# Patient Record
Sex: FEMALE | Race: WHITE | HISPANIC OR LATINO | Employment: FULL TIME | URBAN - METROPOLITAN AREA
[De-identification: names, ages, dates, MRNs, and addresses within clinical notes are randomized per-mention and may not be internally consistent; named-entity substitution may affect disease eponyms.]

---

## 2019-04-25 ENCOUNTER — HOSPITAL ENCOUNTER (EMERGENCY)
Facility: MEDICAL CENTER | Age: 19
End: 2019-04-25
Attending: EMERGENCY MEDICINE
Payer: COMMERCIAL

## 2019-04-25 VITALS
DIASTOLIC BLOOD PRESSURE: 93 MMHG | SYSTOLIC BLOOD PRESSURE: 142 MMHG | RESPIRATION RATE: 16 BRPM | BODY MASS INDEX: 20.96 KG/M2 | WEIGHT: 122.8 LBS | OXYGEN SATURATION: 98 % | HEIGHT: 64 IN | HEART RATE: 101 BPM | TEMPERATURE: 98.3 F

## 2019-04-25 DIAGNOSIS — S51.812A LACERATION OF LEFT FOREARM, INITIAL ENCOUNTER: ICD-10-CM

## 2019-04-25 PROCEDURE — 303747 HCHG EXTRA SUTURE

## 2019-04-25 PROCEDURE — 99283 EMERGENCY DEPT VISIT LOW MDM: CPT

## 2019-04-25 PROCEDURE — 304999 HCHG REPAIR-SIMPLE/INTERMED LEVEL 1

## 2019-04-25 PROCEDURE — 304217 HCHG IRRIGATION SYSTEM

## 2019-04-25 ASSESSMENT — ENCOUNTER SYMPTOMS
BRUISES/BLEEDS EASILY: 0
FOCAL WEAKNESS: 0
TINGLING: 0

## 2019-04-26 NOTE — ED PROVIDER NOTES
"ED Provider Note    Scribed for ANTHONY Ferreira II* by Louie Landry. 4/25/2019  6:30 PM    Means of Arrival: Walk In  History obtained by: Patient  Limitations: None    CHIEF COMPLAINT  Chief Complaint   Patient presents with   • Laceration     Pt cut her L inner arm near the elbow on a piece of glass while taking out the trash. Bleeding controlled.        HPI  Mari Taveras is a 19 y.o. female who presents to the Emergency Department for evaluation of a laceration which was sustained approximately 1.5 hours. She states that she was going to take out the trash and while doing so cut her inner left arm on a shard of glass. There is no active bleeding at this time, and she denies any weakness or tingling to the laceration    REVIEW OF SYSTEMS  Review of Systems   Musculoskeletal: Negative for joint pain.   Skin:        Positive: Laceration   Neurological: Negative for tingling and focal weakness.   Endo/Heme/Allergies: Does not bruise/bleed easily.     See HPI for further details.    PAST MEDICAL HISTORY  Patient denies any past medical problems or history.    SOCIAL HISTORY  Social History     Social History Main Topics   • Smoking status: Never Smoker   • Smokeless tobacco: Never Used   • Alcohol use No   • Drug use: No   • Sexual activity: None noted       SURGICAL HISTORY  patient denies any surgical history    CURRENT MEDICATIONS  Home Medications     Reviewed by Brandy Mcwilliams R.N. (Registered Nurse) on 04/25/19 at 1824  Med List Status: Complete   Medication Last Dose Status        Patient Casey Taking any Medications                       ALLERGIES  Allergies   Allergen Reactions   • Pcn [Penicillins] Hives       PHYSICAL EXAM  VITAL SIGNS: /93   Pulse (!) 101   Temp 36.8 °C (98.3 °F) (Temporal)   Resp 16   Ht 1.626 m (5' 4\")   Wt 55.7 kg (122 lb 12.7 oz)   SpO2 98%   Breastfeeding? No   BMI 21.08 kg/m²     Heart: Regular rate and rythm, no murmurs.    Lungs: No respiratory " distress, regular respirations. Clear to auscultation bilaterally.  Skin: Warm, Dry, No erythema, No rash. 3 cm partial thickness laceration to the medial aspect of the upper left extremity    DIAGNOSTIC STUDIES / PROCEDURES    Laceration Repair Procedure Note    Indication: Laceration    Procedure: The patient was placed in the appropriate position and no anesthesia used. The area was then irrigated with normal saline. The laceration was closed with Dermabond. There were no additional lacerations requiring repair. The wound area was then dressed with abx ointment and dressing.      Total repaired wound length: 3 cm.     Other Items: None    The patient tolerated the procedure well.    Complications: None    COURSE & MEDICAL DECISION MAKING  Pertinent Labs & Imaging studies reviewed. (See chart for details)    6:43 PM This is a 19 y.o. female who presents with a laceration and the differential diagnosis includes but is not limited to laceration. Informed her that I will close the laceration, and discussed the option of using sutures or dermabond depending on whether she was concerned with having a more pronounced scar or not. Patient requested use of dermabond.    6:56 PM - I performed a laceration repair procedure at this time as outlined above. She was given wound care instructions and is instructed to return here in 10 days to have her sutures removed. She understands and the recommended plan of follow up care and return, and agrees to discharge.    The patient is referred to a primary physician for blood pressure management, diabetic screening, and for all other preventative health concerns.    DISPOSITION:  Patient will be discharged home in stable condition.    FOLLOW UP:  Follow up with your primary provider as needed. If any concerns of infection you can come back to the ER to be re-checked          FINAL IMPRESSION  1. Laceration of left forearm, initial encounter          Louie MUSTAFAScribe), am  scribing for, and in the presence of, MILVIA Ferreira II.    Electronically signed by: Louie Landry (Scribe), 4/25/2019    I, MILVIA Ferreira II personally performed the services described in this documentation, as scribed by Louie Landry in my presence, and it is both accurate and complete. E.    The note accurately reflects work and decisions made by me.  Angelo Puentes II  4/25/2019  8:37 PM

## 2019-04-26 NOTE — ED NOTES
Discharge instructions given to pt including follow up with pcp or returning if no improvement of symptoms or to return if worse. Questions answered by RN. Denies any new complaints. Discharged w/stable vitals and able to ambulate w/steady gait. Instructed not to keep it wet x 24 hrs and to watch signs and symptoms of infection.

## 2019-04-26 NOTE — ED TRIAGE NOTES
"Chief Complaint   Patient presents with   • Laceration     Pt cut her L inner arm near the elbow on a piece of glass while taking out the trash. Bleeding controlled.      /93   Pulse (!) 101   Temp 36.8 °C (98.3 °F) (Temporal)   Resp 16   Ht 1.626 m (5' 4\")   Wt 55.7 kg (122 lb 12.7 oz)   SpO2 98%   Breastfeeding? No   BMI 21.08 kg/m²     Pt ambulated into triage with family, complaints as above. VS as above, NAD, encouraged to return to the triage nurse or tech with any new complaints or symptoms.  "

## 2022-02-07 LAB
HBV SURFACE AG SERPL QL IA: NORMAL
HIV 1+2 AB+HIV1 P24 AG SERPL QL IA: NORMAL
RUBV IGG SERPL IA-ACNC: NORMAL
TREPONEMA PALLIDUM IGG+IGM AB [PRESENCE] IN SERUM OR PLASMA BY IMMUNOASSAY: NORMAL

## 2022-06-09 LAB
ABO GROUP BLD: NORMAL
RH BLD: NORMAL

## 2022-06-17 ENCOUNTER — NON-PROVIDER VISIT (OUTPATIENT)
Dept: URGENT CARE | Facility: CLINIC | Age: 22
End: 2022-06-17

## 2022-06-17 DIAGNOSIS — Z02.1 PRE-EMPLOYMENT DRUG SCREENING: ICD-10-CM

## 2022-06-17 LAB
AMP AMPHETAMINE: NORMAL
BAR BARBITURATES: NORMAL
BZO BENZODIAZEPINES: NORMAL
COC COCAINE: NORMAL
INT CON NEG: NORMAL
INT CON POS: NORMAL
MDMA ECSTASY: NORMAL
MET METHAMPHETAMINES: NORMAL
MTD METHADONE: NORMAL
OPI OPIATES: NORMAL
OXY OXYCODONE: NORMAL
PCP PHENCYCLIDINE: NORMAL
POC URINE DRUG SCREEN OCDRS: NEGATIVE
THC: NORMAL

## 2022-06-17 PROCEDURE — 80305 DRUG TEST PRSMV DIR OPT OBS: CPT | Performed by: STUDENT IN AN ORGANIZED HEALTH CARE EDUCATION/TRAINING PROGRAM

## 2022-08-07 ENCOUNTER — ANESTHESIA (OUTPATIENT)
Dept: OBGYN | Facility: MEDICAL CENTER | Age: 22
End: 2022-08-07
Payer: COMMERCIAL

## 2022-08-07 ENCOUNTER — HOSPITAL ENCOUNTER (INPATIENT)
Facility: MEDICAL CENTER | Age: 22
LOS: 3 days | End: 2022-08-10
Attending: SPECIALIST | Admitting: SPECIALIST
Payer: COMMERCIAL

## 2022-08-07 ENCOUNTER — ANESTHESIA EVENT (OUTPATIENT)
Dept: OBGYN | Facility: MEDICAL CENTER | Age: 22
End: 2022-08-07
Payer: COMMERCIAL

## 2022-08-07 LAB
A1 MICROGLOB PLACENTAL VAG QL: POSITIVE
ALBUMIN SERPL BCP-MCNC: 3.8 G/DL (ref 3.2–4.9)
ALBUMIN/GLOB SERPL: 1.3 G/DL
ALP SERPL-CCNC: 506 U/L (ref 30–99)
ALT SERPL-CCNC: 11 U/L (ref 2–50)
ANION GAP SERPL CALC-SCNC: 15 MMOL/L (ref 7–16)
AST SERPL-CCNC: 11 U/L (ref 12–45)
BASOPHILS # BLD AUTO: 0.2 % (ref 0–1.8)
BASOPHILS # BLD: 0.02 K/UL (ref 0–0.12)
BILIRUB SERPL-MCNC: 0.3 MG/DL (ref 0.1–1.5)
BUN SERPL-MCNC: 8 MG/DL (ref 8–22)
CALCIUM SERPL-MCNC: 8.9 MG/DL (ref 8.5–10.5)
CHLORIDE SERPL-SCNC: 106 MMOL/L (ref 96–112)
CO2 SERPL-SCNC: 17 MMOL/L (ref 20–33)
CREAT SERPL-MCNC: 0.41 MG/DL (ref 0.5–1.4)
CREAT UR-MCNC: 79.61 MG/DL
EOSINOPHIL # BLD AUTO: 0.04 K/UL (ref 0–0.51)
EOSINOPHIL NFR BLD: 0.4 % (ref 0–6.9)
ERYTHROCYTE [DISTWIDTH] IN BLOOD BY AUTOMATED COUNT: 45.4 FL (ref 35.9–50)
GFR SERPLBLD CREATININE-BSD FMLA CKD-EPI: 141 ML/MIN/1.73 M 2
GLOBULIN SER CALC-MCNC: 3 G/DL (ref 1.9–3.5)
GLUCOSE SERPL-MCNC: 102 MG/DL (ref 65–99)
GP B STREP DNA SPEC QL NAA+PROBE: NEGATIVE
HCT VFR BLD AUTO: 41.5 % (ref 37–47)
HGB BLD-MCNC: 14.4 G/DL (ref 12–16)
HOLDING TUBE BB 8507: NORMAL
IMM GRANULOCYTES # BLD AUTO: 0.06 K/UL (ref 0–0.11)
IMM GRANULOCYTES NFR BLD AUTO: 0.6 % (ref 0–0.9)
LYMPHOCYTES # BLD AUTO: 1.49 K/UL (ref 1–4.8)
LYMPHOCYTES NFR BLD: 15 % (ref 22–41)
MCH RBC QN AUTO: 32.1 PG (ref 27–33)
MCHC RBC AUTO-ENTMCNC: 34.7 G/DL (ref 33.6–35)
MCV RBC AUTO: 92.6 FL (ref 81.4–97.8)
MONOCYTES # BLD AUTO: 0.78 K/UL (ref 0–0.85)
MONOCYTES NFR BLD AUTO: 7.9 % (ref 0–13.4)
NEUTROPHILS # BLD AUTO: 7.53 K/UL (ref 2–7.15)
NEUTROPHILS NFR BLD: 75.9 % (ref 44–72)
NRBC # BLD AUTO: 0 K/UL
NRBC BLD-RTO: 0 /100 WBC
PLATELET # BLD AUTO: 271 K/UL (ref 164–446)
PMV BLD AUTO: 10.3 FL (ref 9–12.9)
POTASSIUM SERPL-SCNC: 3.7 MMOL/L (ref 3.6–5.5)
PROT SERPL-MCNC: 6.8 G/DL (ref 6–8.2)
PROT UR-MCNC: 15 MG/DL (ref 0–15)
PROT/CREAT UR: 188 MG/G (ref 10–107)
RBC # BLD AUTO: 4.48 M/UL (ref 4.2–5.4)
SODIUM SERPL-SCNC: 138 MMOL/L (ref 135–145)
URATE SERPL-MCNC: 3.4 MG/DL (ref 1.9–8.2)
WBC # BLD AUTO: 9.9 K/UL (ref 4.8–10.8)

## 2022-08-07 PROCEDURE — 84550 ASSAY OF BLOOD/URIC ACID: CPT

## 2022-08-07 PROCEDURE — 87150 DNA/RNA AMPLIFIED PROBE: CPT

## 2022-08-07 PROCEDURE — 700111 HCHG RX REV CODE 636 W/ 250 OVERRIDE (IP): Performed by: SPECIALIST

## 2022-08-07 PROCEDURE — 700102 HCHG RX REV CODE 250 W/ 637 OVERRIDE(OP): Performed by: SPECIALIST

## 2022-08-07 PROCEDURE — 700101 HCHG RX REV CODE 250: Performed by: SPECIALIST

## 2022-08-07 PROCEDURE — 3E0P7VZ INTRODUCTION OF HORMONE INTO FEMALE REPRODUCTIVE, VIA NATURAL OR ARTIFICIAL OPENING: ICD-10-PCS | Performed by: SPECIALIST

## 2022-08-07 PROCEDURE — 85025 COMPLETE CBC W/AUTO DIFF WBC: CPT

## 2022-08-07 PROCEDURE — 87081 CULTURE SCREEN ONLY: CPT

## 2022-08-07 PROCEDURE — 84112 EVAL AMNIOTIC FLUID PROTEIN: CPT

## 2022-08-07 PROCEDURE — 80053 COMPREHEN METABOLIC PANEL: CPT

## 2022-08-07 PROCEDURE — 302449 STATCHG TRIAGE ONLY (STATISTIC)

## 2022-08-07 PROCEDURE — 36415 COLL VENOUS BLD VENIPUNCTURE: CPT

## 2022-08-07 PROCEDURE — 3E033VJ INTRODUCTION OF OTHER HORMONE INTO PERIPHERAL VEIN, PERCUTANEOUS APPROACH: ICD-10-PCS | Performed by: SPECIALIST

## 2022-08-07 PROCEDURE — 700105 HCHG RX REV CODE 258: Performed by: SPECIALIST

## 2022-08-07 PROCEDURE — 770002 HCHG ROOM/CARE - OB PRIVATE (112)

## 2022-08-07 PROCEDURE — 87653 STREP B DNA AMP PROBE: CPT

## 2022-08-07 PROCEDURE — 82570 ASSAY OF URINE CREATININE: CPT

## 2022-08-07 PROCEDURE — A9270 NON-COVERED ITEM OR SERVICE: HCPCS | Performed by: SPECIALIST

## 2022-08-07 PROCEDURE — 84156 ASSAY OF PROTEIN URINE: CPT

## 2022-08-07 RX ORDER — TERBUTALINE SULFATE 1 MG/ML
0.25 INJECTION, SOLUTION SUBCUTANEOUS
Status: DISCONTINUED | OUTPATIENT
Start: 2022-08-07 | End: 2022-08-08

## 2022-08-07 RX ORDER — ONDANSETRON 4 MG/1
4 TABLET, ORALLY DISINTEGRATING ORAL EVERY 6 HOURS PRN
Status: DISCONTINUED | OUTPATIENT
Start: 2022-08-07 | End: 2022-08-08

## 2022-08-07 RX ORDER — OXYTOCIN 10 [USP'U]/ML
10 INJECTION, SOLUTION INTRAMUSCULAR; INTRAVENOUS
Status: DISCONTINUED | OUTPATIENT
Start: 2022-08-07 | End: 2022-08-08 | Stop reason: HOSPADM

## 2022-08-07 RX ORDER — BETAMETHASONE SODIUM PHOSPHATE AND BETAMETHASONE ACETATE 3; 3 MG/ML; MG/ML
12 INJECTION, SUSPENSION INTRA-ARTICULAR; INTRALESIONAL; INTRAMUSCULAR; SOFT TISSUE EVERY 24 HOURS
Status: DISCONTINUED | OUTPATIENT
Start: 2022-08-07 | End: 2022-08-08

## 2022-08-07 RX ORDER — SODIUM CHLORIDE, SODIUM LACTATE, POTASSIUM CHLORIDE, CALCIUM CHLORIDE 600; 310; 30; 20 MG/100ML; MG/100ML; MG/100ML; MG/100ML
INJECTION, SOLUTION INTRAVENOUS CONTINUOUS
Status: DISCONTINUED | OUTPATIENT
Start: 2022-08-07 | End: 2022-08-08

## 2022-08-07 RX ORDER — IBUPROFEN 800 MG/1
800 TABLET ORAL
Status: COMPLETED | OUTPATIENT
Start: 2022-08-07 | End: 2022-08-08

## 2022-08-07 RX ORDER — ACETAMINOPHEN 500 MG
1000 TABLET ORAL
Status: DISCONTINUED | OUTPATIENT
Start: 2022-08-07 | End: 2022-08-08 | Stop reason: HOSPADM

## 2022-08-07 RX ORDER — CLINDAMYCIN PHOSPHATE 600 MG/50ML
600 INJECTION, SOLUTION INTRAVENOUS EVERY 8 HOURS
Status: DISCONTINUED | OUTPATIENT
Start: 2022-08-07 | End: 2022-08-07

## 2022-08-07 RX ORDER — ONDANSETRON 2 MG/ML
4 INJECTION INTRAMUSCULAR; INTRAVENOUS EVERY 6 HOURS PRN
Status: DISCONTINUED | OUTPATIENT
Start: 2022-08-07 | End: 2022-08-08

## 2022-08-07 RX ADMIN — SODIUM CHLORIDE, POTASSIUM CHLORIDE, SODIUM LACTATE AND CALCIUM CHLORIDE: 600; 310; 30; 20 INJECTION, SOLUTION INTRAVENOUS at 22:15

## 2022-08-07 RX ADMIN — SODIUM CHLORIDE, POTASSIUM CHLORIDE, SODIUM LACTATE AND CALCIUM CHLORIDE: 600; 310; 30; 20 INJECTION, SOLUTION INTRAVENOUS at 11:02

## 2022-08-07 RX ADMIN — OXYTOCIN 1 MILLI-UNITS/MIN: 10 INJECTION, SOLUTION INTRAMUSCULAR; INTRAVENOUS at 22:15

## 2022-08-07 RX ADMIN — CLINDAMYCIN IN 5 PERCENT DEXTROSE 600 MG: 12 INJECTION, SOLUTION INTRAVENOUS at 12:44

## 2022-08-07 RX ADMIN — MISOPROSTOL 25 MCG: 100 TABLET ORAL at 11:43

## 2022-08-07 RX ADMIN — BETAMETHASONE SODIUM PHOSPHATE AND BETAMETHASONE ACETATE 12 MG: 3; 3 INJECTION, SUSPENSION INTRA-ARTICULAR; INTRALESIONAL; INTRAMUSCULAR; SOFT TISSUE at 11:43

## 2022-08-07 RX ADMIN — MISOPROSTOL 25 MCG: 100 TABLET ORAL at 16:29

## 2022-08-07 ASSESSMENT — LIFESTYLE VARIABLES
AVERAGE NUMBER OF DAYS PER WEEK YOU HAVE A DRINK CONTAINING ALCOHOL: 0
CONSUMPTION TOTAL: NEGATIVE
ON A TYPICAL DAY WHEN YOU DRINK ALCOHOL HOW MANY DRINKS DO YOU HAVE: 0
EVER HAD A DRINK FIRST THING IN THE MORNING TO STEADY YOUR NERVES TO GET RID OF A HANGOVER: NO
TOTAL SCORE: 0
TOTAL SCORE: 0
EVER_SMOKED: NEVER
HAVE PEOPLE ANNOYED YOU BY CRITICIZING YOUR DRINKING: NO
HOW MANY TIMES IN THE PAST YEAR HAVE YOU HAD 5 OR MORE DRINKS IN A DAY: 0
ALCOHOL_USE: NO
EVER FELT BAD OR GUILTY ABOUT YOUR DRINKING: NO
TOTAL SCORE: 0
HAVE YOU EVER FELT YOU SHOULD CUT DOWN ON YOUR DRINKING: NO

## 2022-08-07 ASSESSMENT — COPD QUESTIONNAIRES
DO YOU EVER COUGH UP ANY MUCUS OR PHLEGM?: NO/ONLY WITH OCCASIONAL COLDS OR INFECTIONS
IN THE PAST 12 MONTHS DO YOU DO LESS THAN YOU USED TO BECAUSE OF YOUR BREATHING PROBLEMS: DISAGREE/UNSURE
DURING THE PAST 4 WEEKS HOW MUCH DID YOU FEEL SHORT OF BREATH: NONE/LITTLE OF THE TIME
COPD SCREENING SCORE: 0
HAVE YOU SMOKED AT LEAST 100 CIGARETTES IN YOUR ENTIRE LIFE: NO/DON'T KNOW

## 2022-08-07 ASSESSMENT — PATIENT HEALTH QUESTIONNAIRE - PHQ9
1. LITTLE INTEREST OR PLEASURE IN DOING THINGS: NOT AT ALL
SUM OF ALL RESPONSES TO PHQ9 QUESTIONS 1 AND 2: 0
2. FEELING DOWN, DEPRESSED, IRRITABLE, OR HOPELESS: NOT AT ALL

## 2022-08-07 ASSESSMENT — PAIN DESCRIPTION - PAIN TYPE: TYPE: ACUTE PAIN

## 2022-08-07 NOTE — PROGRESS NOTES
0950- pt to triage bed 5 c/o lof since 0700. Pt states gush of clear fluid. Pt denies uc or bleeding and states +fm. Pt placed on efm/toco with audible fht 135. Will do sve and fern if necessary. 1st bp elevated so cuff switched arm and retaken. Pt denies ha,blurred vision or epigastric pain and no clonus noted    1020-called a second nurse to come do sve unable to reach cervix. Will both nurses exam clear fluid noted. Spoke to dr mercado and orders rec'd for iol admission with [facility] labs and cytotec. Pt aware of poc and charge rn aware    1100- report given to lillian lloyd

## 2022-08-07 NOTE — CARE PLAN
The patient is Stable - Low risk of patient condition declining or worsening    Shift Goals  Clinical Goals: healthy baby, healthy mom  Patient Goals: healthy baby, healthy mom  Family Goals: stable    Progress made toward(s) clinical / shift goals:  monitoring baby and maternal vitals     Patient is not progressing towards the following goals:

## 2022-08-07 NOTE — PROGRESS NOTES
1100-  Report received from Zakiya ALCALA. Plan of care discussed. Patient moved to labor room.     1145- patient given first dose of betamethasone.     1200- Dr Andersen updated on patient status. Order received for antibiotics. GBS collected and sent to lab.     1545- Dr Andersen updated on patient status. Okay for patient to eat. Order received for oral cytotec.     1900- report given to Hermelinda ALCALA

## 2022-08-07 NOTE — H&P
Nitin Mittal Mari          YOB: 2000  Date of today's admission: 2022  Facility: [facility] Labor & Delivery    ID: The patient is a very pleasant 22-year old nullipara who is today 35 weeks and 0 days gestation.    Chief Complaint: The patient presented to Labor & Delivery this morning with complaints of leakage of fluid per vagina starting this morning.    History of Present Illness: The patient has had prenatal care with Dr. Blackburn.  Her pregnancy has been uncomplicated until today when she presented to Labor & Delivery with complaints of leakage of fluid per vagina starting earlier this morning.  She presented to labor and delivery at about 10:00 this morning with complaints of leakage of fluid per vagina starting at about 7:00 this morning.  She stated that she had noticed a gush of clear fluid per vagina.  She denied any uterine contractions and she denied any vaginal bleeding and stated that she was continuing to feel fetal movement.  Speculum exam revealed obvious evidence of spontaneous rupture of membranes.  An AmniSure test was performed and was positive.  She was diagnosed as having  premature rupture of membranes (PPROM).  She was admitted.  Her cervix was found to be unfavorable and so she was given a dose of Cytotec later in the morning.  Because of unknown group B strep status she was started on antibiotics.  Because she states she is allergic to penicillin she was started on clindamycin intravenously.  The fetal heart tracing is category 1.  She says that she feels fine and has no problems or complaints and her vital signs are stable and she is afebrile and she appears well-developed and well-nourished and relaxed and alert and comfortable and in no apparent distress.  We will recheck her cervix and give another dose of Cytotec if her cervix is still unchanged.    Past Medical History: The patient says that she has no known  drug allergies.    Past Surgical History: The patient says that she has had no previous surgeries.    Medications: The patient does not normally take any medications other than for prenatal vitamins.    Allergies: The patient says that she is allergic to penicillins.    Social History: The patient denies smoking.  She denies consuming alcoholic beverages.  She denies the use of recreational drugs.    Review of Systems  General: The patient denies any fevers, chills, sweats.  Pulmonary: The patient denies any coughing, wheezing, chest pain, shortness of breath.  Cardiovascular: The patient denies any palpitations, dyspnea, chest pain.  Gastrointestinal: The patient denies any nausea, vomiting, diarrhea, constipation, hematochezia, melena, history of hepatitis, history of jaundice.  Genitourinary: The patient complains of leakage of fluid per vagina starting at 7:00 this morning.  Musculoskeletal: The patient denies any arthralgias or myalgias other than for hip pain and low back pain.   Neurological: No headaches or syncope or seizures.     Physical Exam:   Vital Signs: The patient's vital signs are stable and she is afebrile.  General: The patient appears well developed and well nourished and relaxed and alert and comfortable and in no apparent distress.    HEENT :  Normo-cephalic, atraumatic, pupils equal, round, reactive to light and accommodation, extra ocular motions intact, pharynx clear; there is no thyromegaly. There is no cervical lymphadenopathy.  Chest: Heart regular rate and rhythm, with no murmurs or rubs or gallops; the lungs are clear to auscultation bilaterally.  Abdomen: The abdomen is gravid and is about 8 months size.   Pelvic: The cervix is fingertip and 50% effaced and -1 station.  Extremities: No clubbing or cyanosis or edema.   Neurological: non-focal.     Assessment:   1.)  Intrauterine pregnancy at 35 weeks and 0 days gestation.  2.)   premature rupture of membranes (PPROM).    Plan:    We we will continue electronic fetal monitoring and recheck her cervix and likely give another dose of Cytotec 25 mcg this time perhaps orally.  I discussed with the patient today the subject of  premature rupture of membranes and explained to her that because she is now 35 weeks gestation that induction of labor is indicated.  I discussed with her the subject of induction of labor.  She acknowledged the things that we discussed.  We will give analgesia as needed and continue clindamycin intravenously because of unknown group B strep and we will anticipate obstetrical delivery.            ________________________  Walter Andersen M.D.

## 2022-08-08 LAB — GP B STREP DNA SPEC QL NAA+PROBE: NEGATIVE

## 2022-08-08 PROCEDURE — 700111 HCHG RX REV CODE 636 W/ 250 OVERRIDE (IP): Performed by: ANESTHESIOLOGY

## 2022-08-08 PROCEDURE — 700102 HCHG RX REV CODE 250 W/ 637 OVERRIDE(OP): Performed by: ANESTHESIOLOGY

## 2022-08-08 PROCEDURE — 770002 HCHG ROOM/CARE - OB PRIVATE (112)

## 2022-08-08 PROCEDURE — 700105 HCHG RX REV CODE 258: Performed by: ANESTHESIOLOGY

## 2022-08-08 PROCEDURE — 160048 HCHG OR STATISTICAL LEVEL 1-5: Performed by: SPECIALIST

## 2022-08-08 PROCEDURE — C1755 CATHETER, INTRASPINAL: HCPCS | Performed by: SPECIALIST

## 2022-08-08 PROCEDURE — 700102 HCHG RX REV CODE 250 W/ 637 OVERRIDE(OP): Performed by: SPECIALIST

## 2022-08-08 PROCEDURE — 59514 CESAREAN DELIVERY ONLY: CPT | Mod: 80 | Performed by: OBSTETRICS & GYNECOLOGY

## 2022-08-08 PROCEDURE — 160029 HCHG SURGERY MINUTES - 1ST 30 MINS LEVEL 4: Performed by: SPECIALIST

## 2022-08-08 PROCEDURE — A9270 NON-COVERED ITEM OR SERVICE: HCPCS | Performed by: ANESTHESIOLOGY

## 2022-08-08 PROCEDURE — 700101 HCHG RX REV CODE 250: Performed by: ANESTHESIOLOGY

## 2022-08-08 PROCEDURE — 160035 HCHG PACU - 1ST 60 MINS PHASE I: Performed by: SPECIALIST

## 2022-08-08 PROCEDURE — 160002 HCHG RECOVERY MINUTES (STAT): Performed by: SPECIALIST

## 2022-08-08 PROCEDURE — 160009 HCHG ANES TIME/MIN: Performed by: SPECIALIST

## 2022-08-08 PROCEDURE — A9270 NON-COVERED ITEM OR SERVICE: HCPCS | Performed by: SPECIALIST

## 2022-08-08 PROCEDURE — 01961 ANES CESAREAN DELIVERY ONLY: CPT | Performed by: ANESTHESIOLOGY

## 2022-08-08 PROCEDURE — 700105 HCHG RX REV CODE 258: Performed by: SPECIALIST

## 2022-08-08 PROCEDURE — 160041 HCHG SURGERY MINUTES - EA ADDL 1 MIN LEVEL 4: Performed by: SPECIALIST

## 2022-08-08 PROCEDURE — 700111 HCHG RX REV CODE 636 W/ 250 OVERRIDE (IP): Performed by: SPECIALIST

## 2022-08-08 RX ORDER — HYDROMORPHONE HYDROCHLORIDE 1 MG/ML
0.4 INJECTION, SOLUTION INTRAMUSCULAR; INTRAVENOUS; SUBCUTANEOUS
Status: DISCONTINUED | OUTPATIENT
Start: 2022-08-08 | End: 2022-08-09

## 2022-08-08 RX ORDER — KETOROLAC TROMETHAMINE 30 MG/ML
30 INJECTION, SOLUTION INTRAMUSCULAR; INTRAVENOUS EVERY 6 HOURS
Status: DISCONTINUED | OUTPATIENT
Start: 2022-08-08 | End: 2022-08-09

## 2022-08-08 RX ORDER — DIPHENHYDRAMINE HYDROCHLORIDE 50 MG/ML
25 INJECTION INTRAMUSCULAR; INTRAVENOUS EVERY 6 HOURS PRN
Status: DISCONTINUED | OUTPATIENT
Start: 2022-08-09 | End: 2022-08-10 | Stop reason: HOSPADM

## 2022-08-08 RX ORDER — IBUPROFEN 800 MG/1
800 TABLET ORAL EVERY 8 HOURS
Status: DISCONTINUED | OUTPATIENT
Start: 2022-08-09 | End: 2022-08-10 | Stop reason: HOSPADM

## 2022-08-08 RX ORDER — SODIUM CHLORIDE, SODIUM LACTATE, POTASSIUM CHLORIDE, CALCIUM CHLORIDE 600; 310; 30; 20 MG/100ML; MG/100ML; MG/100ML; MG/100ML
INJECTION, SOLUTION INTRAVENOUS PRN
Status: DISCONTINUED | OUTPATIENT
Start: 2022-08-08 | End: 2022-08-10 | Stop reason: HOSPADM

## 2022-08-08 RX ORDER — OXYCODONE HYDROCHLORIDE 10 MG/1
10 TABLET ORAL EVERY 4 HOURS PRN
Status: DISCONTINUED | OUTPATIENT
Start: 2022-08-08 | End: 2022-08-09

## 2022-08-08 RX ORDER — CARBOPROST TROMETHAMINE 250 UG/ML
250 INJECTION, SOLUTION INTRAMUSCULAR
Status: DISCONTINUED | OUTPATIENT
Start: 2022-08-08 | End: 2022-08-10 | Stop reason: HOSPADM

## 2022-08-08 RX ORDER — OXYCODONE HYDROCHLORIDE 5 MG/1
5 TABLET ORAL EVERY 4 HOURS PRN
Status: DISCONTINUED | OUTPATIENT
Start: 2022-08-08 | End: 2022-08-09

## 2022-08-08 RX ORDER — CEFAZOLIN SODIUM 1 G/3ML
INJECTION, POWDER, FOR SOLUTION INTRAMUSCULAR; INTRAVENOUS PRN
Status: DISCONTINUED | OUTPATIENT
Start: 2022-08-08 | End: 2022-08-08 | Stop reason: SURG

## 2022-08-08 RX ORDER — MORPHINE SULFATE 0.5 MG/ML
INJECTION, SOLUTION EPIDURAL; INTRATHECAL; INTRAVENOUS PRN
Status: DISCONTINUED | OUTPATIENT
Start: 2022-08-08 | End: 2022-08-08 | Stop reason: SURG

## 2022-08-08 RX ORDER — ONDANSETRON 2 MG/ML
4 INJECTION INTRAMUSCULAR; INTRAVENOUS EVERY 6 HOURS PRN
Status: DISCONTINUED | OUTPATIENT
Start: 2022-08-09 | End: 2022-08-10 | Stop reason: HOSPADM

## 2022-08-08 RX ORDER — ACETAMINOPHEN 500 MG
1000 TABLET ORAL EVERY 6 HOURS
Status: DISCONTINUED | OUTPATIENT
Start: 2022-08-08 | End: 2022-08-09

## 2022-08-08 RX ORDER — OXYCODONE HYDROCHLORIDE 10 MG/1
10 TABLET ORAL EVERY 4 HOURS PRN
Status: DISCONTINUED | OUTPATIENT
Start: 2022-08-09 | End: 2022-08-10 | Stop reason: HOSPADM

## 2022-08-08 RX ORDER — ACETAMINOPHEN 500 MG
1000 TABLET ORAL EVERY 6 HOURS
Status: DISCONTINUED | OUTPATIENT
Start: 2022-08-09 | End: 2022-08-10 | Stop reason: HOSPADM

## 2022-08-08 RX ORDER — DIPHENHYDRAMINE HYDROCHLORIDE 50 MG/ML
12.5 INJECTION INTRAMUSCULAR; INTRAVENOUS EVERY 6 HOURS PRN
Status: DISCONTINUED | OUTPATIENT
Start: 2022-08-08 | End: 2022-08-09

## 2022-08-08 RX ORDER — DIPHENHYDRAMINE HYDROCHLORIDE 50 MG/ML
25 INJECTION INTRAMUSCULAR; INTRAVENOUS EVERY 6 HOURS PRN
Status: DISCONTINUED | OUTPATIENT
Start: 2022-08-08 | End: 2022-08-09

## 2022-08-08 RX ORDER — HYDROMORPHONE HYDROCHLORIDE 1 MG/ML
0.2 INJECTION, SOLUTION INTRAMUSCULAR; INTRAVENOUS; SUBCUTANEOUS
Status: DISCONTINUED | OUTPATIENT
Start: 2022-08-08 | End: 2022-08-09

## 2022-08-08 RX ORDER — OXYCODONE HYDROCHLORIDE 5 MG/1
5 TABLET ORAL EVERY 4 HOURS PRN
Status: DISCONTINUED | OUTPATIENT
Start: 2022-08-09 | End: 2022-08-10 | Stop reason: HOSPADM

## 2022-08-08 RX ORDER — ONDANSETRON 2 MG/ML
4 INJECTION INTRAMUSCULAR; INTRAVENOUS EVERY 6 HOURS PRN
Status: DISCONTINUED | OUTPATIENT
Start: 2022-08-08 | End: 2022-08-09

## 2022-08-08 RX ORDER — ONDANSETRON 4 MG/1
4 TABLET, ORALLY DISINTEGRATING ORAL EVERY 6 HOURS PRN
Status: DISCONTINUED | OUTPATIENT
Start: 2022-08-09 | End: 2022-08-10 | Stop reason: HOSPADM

## 2022-08-08 RX ORDER — VITAMIN A ACETATE, BETA CAROTENE, ASCORBIC ACID, CHOLECALCIFEROL, .ALPHA.-TOCOPHEROL ACETATE, DL-, THIAMINE MONONITRATE, RIBOFLAVIN, NIACINAMIDE, PYRIDOXINE HYDROCHLORIDE, FOLIC ACID, CYANOCOBALAMIN, CALCIUM CARBONATE, FERROUS FUMARATE, ZINC OXIDE, CUPRIC OXIDE 3080; 12; 120; 400; 1; 1.84; 3; 20; 22; 920; 25; 200; 27; 10; 2 [IU]/1; UG/1; MG/1; [IU]/1; MG/1; MG/1; MG/1; MG/1; MG/1; [IU]/1; MG/1; MG/1; MG/1; MG/1; MG/1
1 TABLET, FILM COATED ORAL
Status: DISCONTINUED | OUTPATIENT
Start: 2022-08-08 | End: 2022-08-10 | Stop reason: HOSPADM

## 2022-08-08 RX ORDER — METOCLOPRAMIDE HYDROCHLORIDE 5 MG/ML
10 INJECTION INTRAMUSCULAR; INTRAVENOUS ONCE
Status: COMPLETED | OUTPATIENT
Start: 2022-08-08 | End: 2022-08-08

## 2022-08-08 RX ORDER — AZITHROMYCIN 500 MG/5ML
500 INJECTION, POWDER, LYOPHILIZED, FOR SOLUTION INTRAVENOUS ONCE
Status: COMPLETED | OUTPATIENT
Start: 2022-08-08 | End: 2022-08-08

## 2022-08-08 RX ORDER — METHYLERGONOVINE MALEATE 0.2 MG/ML
0.2 INJECTION INTRAVENOUS
Status: DISCONTINUED | OUTPATIENT
Start: 2022-08-08 | End: 2022-08-10 | Stop reason: HOSPADM

## 2022-08-08 RX ORDER — DIPHENHYDRAMINE HCL 25 MG
25 TABLET ORAL EVERY 6 HOURS PRN
Status: DISCONTINUED | OUTPATIENT
Start: 2022-08-09 | End: 2022-08-10 | Stop reason: HOSPADM

## 2022-08-08 RX ORDER — BUPIVACAINE HYDROCHLORIDE 7.5 MG/ML
INJECTION, SOLUTION INTRASPINAL
Status: COMPLETED | OUTPATIENT
Start: 2022-08-08 | End: 2022-08-08

## 2022-08-08 RX ORDER — DOCUSATE SODIUM 100 MG/1
100 CAPSULE, LIQUID FILLED ORAL 2 TIMES DAILY PRN
Status: DISCONTINUED | OUTPATIENT
Start: 2022-08-08 | End: 2022-08-10 | Stop reason: HOSPADM

## 2022-08-08 RX ORDER — SODIUM CHLORIDE, SODIUM GLUCONATE, SODIUM ACETATE, POTASSIUM CHLORIDE AND MAGNESIUM CHLORIDE 526; 502; 368; 37; 30 MG/100ML; MG/100ML; MG/100ML; MG/100ML; MG/100ML
1000 INJECTION, SOLUTION INTRAVENOUS ONCE
Status: COMPLETED | OUTPATIENT
Start: 2022-08-08 | End: 2022-08-08

## 2022-08-08 RX ORDER — BUPIVACAINE HYDROCHLORIDE 7.5 MG/ML
INJECTION, SOLUTION INTRASPINAL PRN
Status: DISCONTINUED | OUTPATIENT
Start: 2022-08-08 | End: 2022-08-08 | Stop reason: SURG

## 2022-08-08 RX ORDER — ONDANSETRON 2 MG/ML
INJECTION INTRAMUSCULAR; INTRAVENOUS PRN
Status: DISCONTINUED | OUTPATIENT
Start: 2022-08-08 | End: 2022-08-08 | Stop reason: SURG

## 2022-08-08 RX ORDER — MISOPROSTOL 200 UG/1
800 TABLET ORAL
Status: DISCONTINUED | OUTPATIENT
Start: 2022-08-08 | End: 2022-08-10 | Stop reason: HOSPADM

## 2022-08-08 RX ORDER — ACETAMINOPHEN 500 MG
1000 TABLET ORAL EVERY 6 HOURS PRN
Status: DISCONTINUED | OUTPATIENT
Start: 2022-08-12 | End: 2022-08-10 | Stop reason: HOSPADM

## 2022-08-08 RX ORDER — IBUPROFEN 800 MG/1
800 TABLET ORAL EVERY 8 HOURS PRN
Status: DISCONTINUED | OUTPATIENT
Start: 2022-08-12 | End: 2022-08-10 | Stop reason: HOSPADM

## 2022-08-08 RX ORDER — SODIUM CHLORIDE, SODIUM LACTATE, POTASSIUM CHLORIDE, CALCIUM CHLORIDE 600; 310; 30; 20 MG/100ML; MG/100ML; MG/100ML; MG/100ML
INJECTION, SOLUTION INTRAVENOUS ONCE
Status: ACTIVE | OUTPATIENT
Start: 2022-08-08 | End: 2022-08-09

## 2022-08-08 RX ORDER — CITRIC ACID/SODIUM CITRATE 334-500MG
30 SOLUTION, ORAL ORAL ONCE
Status: COMPLETED | OUTPATIENT
Start: 2022-08-08 | End: 2022-08-08

## 2022-08-08 RX ORDER — SODIUM CHLORIDE, SODIUM GLUCONATE, SODIUM ACETATE, POTASSIUM CHLORIDE AND MAGNESIUM CHLORIDE 526; 502; 368; 37; 30 MG/100ML; MG/100ML; MG/100ML; MG/100ML; MG/100ML
INJECTION, SOLUTION INTRAVENOUS
Status: DISCONTINUED | OUTPATIENT
Start: 2022-08-08 | End: 2022-08-08 | Stop reason: SURG

## 2022-08-08 RX ORDER — DEXAMETHASONE SODIUM PHOSPHATE 4 MG/ML
INJECTION, SOLUTION INTRA-ARTICULAR; INTRALESIONAL; INTRAMUSCULAR; INTRAVENOUS; SOFT TISSUE PRN
Status: DISCONTINUED | OUTPATIENT
Start: 2022-08-08 | End: 2022-08-08 | Stop reason: SURG

## 2022-08-08 RX ORDER — OXYTOCIN 10 [USP'U]/ML
10 INJECTION, SOLUTION INTRAMUSCULAR; INTRAVENOUS
Status: DISCONTINUED | OUTPATIENT
Start: 2022-08-08 | End: 2022-08-10 | Stop reason: HOSPADM

## 2022-08-08 RX ADMIN — IBUPROFEN 800 MG: 800 TABLET, FILM COATED ORAL at 05:18

## 2022-08-08 RX ADMIN — OXYTOCIN 1000 ML: 10 INJECTION, SOLUTION INTRAMUSCULAR; INTRAVENOUS at 03:10

## 2022-08-08 RX ADMIN — SODIUM CHLORIDE, SODIUM GLUCONATE, SODIUM ACETATE, POTASSIUM CHLORIDE AND MAGNESIUM CHLORIDE: 526; 502; 368; 37; 30 INJECTION, SOLUTION INTRAVENOUS at 02:27

## 2022-08-08 RX ADMIN — ACETAMINOPHEN 1000 MG: 500 TABLET ORAL at 23:50

## 2022-08-08 RX ADMIN — ACETAMINOPHEN 1000 MG: 500 TABLET ORAL at 11:45

## 2022-08-08 RX ADMIN — SODIUM CHLORIDE, SODIUM GLUCONATE, SODIUM ACETATE, POTASSIUM CHLORIDE AND MAGNESIUM CHLORIDE 1000 ML: 526; 502; 368; 37; 30 INJECTION, SOLUTION INTRAVENOUS at 01:50

## 2022-08-08 RX ADMIN — FAMOTIDINE 20 MG: 10 INJECTION, SOLUTION INTRAVENOUS at 01:54

## 2022-08-08 RX ADMIN — OXYTOCIN 125 ML/HR: 10 INJECTION, SOLUTION INTRAMUSCULAR; INTRAVENOUS at 05:19

## 2022-08-08 RX ADMIN — CEFAZOLIN 2 G: 330 INJECTION, POWDER, FOR SOLUTION INTRAMUSCULAR; INTRAVENOUS at 02:42

## 2022-08-08 RX ADMIN — DEXAMETHASONE SODIUM PHOSPHATE 8 MG: 4 INJECTION, SOLUTION INTRA-ARTICULAR; INTRALESIONAL; INTRAMUSCULAR; INTRAVENOUS; SOFT TISSUE at 03:14

## 2022-08-08 RX ADMIN — MORPHINE SULFATE 0.3 MG: 0.5 INJECTION, SOLUTION EPIDURAL; INTRATHECAL; INTRAVENOUS at 02:41

## 2022-08-08 RX ADMIN — BUPIVACAINE HYDROCHLORIDE 0.5 ML: 7.5 INJECTION, SOLUTION INTRASPINAL at 02:21

## 2022-08-08 RX ADMIN — SODIUM CITRATE AND CITRIC ACID MONOHYDRATE 30 ML: 500; 334 SOLUTION ORAL at 01:54

## 2022-08-08 RX ADMIN — AZITHROMYCIN FOR INJECTION INJECTION, POWDER, LYOPHILIZED, FOR SOLUTION 500 MG: 500 INJECTION INTRAVENOUS at 01:50

## 2022-08-08 RX ADMIN — BUPIVACAINE HYDROCHLORIDE IN DEXTROSE 1.6 ML: 7.5 INJECTION, SOLUTION SUBARACHNOID at 02:41

## 2022-08-08 RX ADMIN — FENTANYL CITRATE 15 MCG: 50 INJECTION, SOLUTION INTRAMUSCULAR; INTRAVENOUS at 02:41

## 2022-08-08 RX ADMIN — Medication 2000 ML/HR: at 03:10

## 2022-08-08 RX ADMIN — KETOROLAC TROMETHAMINE 30 MG: 30 INJECTION, SOLUTION INTRAMUSCULAR at 19:15

## 2022-08-08 RX ADMIN — ONDANSETRON 4 MG: 2 INJECTION INTRAMUSCULAR; INTRAVENOUS at 03:08

## 2022-08-08 RX ADMIN — SODIUM CHLORIDE, POTASSIUM CHLORIDE, SODIUM LACTATE AND CALCIUM CHLORIDE: 600; 310; 30; 20 INJECTION, SOLUTION INTRAVENOUS at 00:53

## 2022-08-08 RX ADMIN — METOCLOPRAMIDE 10 MG: 5 INJECTION, SOLUTION INTRAMUSCULAR; INTRAVENOUS at 01:54

## 2022-08-08 RX ADMIN — ACETAMINOPHEN 1000 MG: 500 TABLET ORAL at 17:57

## 2022-08-08 RX ADMIN — PRENATAL WITH FERROUS FUM AND FOLIC ACID 1 TABLET: 3080; 920; 120; 400; 22; 1.84; 3; 20; 10; 1; 12; 200; 27; 25; 2 TABLET ORAL at 11:44

## 2022-08-08 ASSESSMENT — EDINBURGH POSTNATAL DEPRESSION SCALE (EPDS)
I HAVE FELT SAD OR MISERABLE: NO, NOT AT ALL
THE THOUGHT OF HARMING MYSELF HAS OCCURRED TO ME: NEVER
I HAVE FELT SCARED OR PANICKY FOR NO GOOD REASON: NO, NOT MUCH
THINGS HAVE BEEN GETTING ON TOP OF ME: NO, MOST OF THE TIME I HAVE COPED QUITE WELL
I HAVE BEEN SO UNHAPPY THAT I HAVE BEEN CRYING: NO, NEVER
I HAVE BEEN ABLE TO LAUGH AND SEE THE FUNNY SIDE OF THINGS: AS MUCH AS I ALWAYS COULD
I HAVE BLAMED MYSELF UNNECESSARILY WHEN THINGS WENT WRONG: YES, SOME OF THE TIME
I HAVE BEEN SO UNHAPPY THAT I HAVE HAD DIFFICULTY SLEEPING: NOT AT ALL
I HAVE LOOKED FORWARD WITH ENJOYMENT TO THINGS: AS MUCH AS I EVER DID
I HAVE BEEN ANXIOUS OR WORRIED FOR NO GOOD REASON: YES, SOMETIMES

## 2022-08-08 ASSESSMENT — PAIN SCALES - GENERAL: PAIN_LEVEL: 0

## 2022-08-08 ASSESSMENT — PAIN DESCRIPTION - PAIN TYPE: TYPE: ACUTE PAIN

## 2022-08-08 NOTE — ANESTHESIA PREPROCEDURE EVALUATION
Case: [id number] Date/Time: 22 0200    Procedure:  SECTION, PRIMARY (Abdomen)    Anesthesia type: Spinal    Pre-op diagnosis: fetal intolerance    Location: [facility] OR 02 / SURGERY LABOR AND DELIVERY    Surgeons: Walter Andersen M.D.          Relevant Problems   No relevant active problems       Physical Exam    Airway   Mallampati: II  TM distance: >3 FB  Neck ROM: full       Cardiovascular - normal exam  Rhythm: regular  Rate: normal  (-) murmur     Dental - normal exam           Pulmonary - normal exam  Breath sounds clear to auscultation     Abdominal    Neurological - normal exam                 Anesthesia Plan    ASA 2       Plan - spinal   Neuraxial block will be primary anesthetic                Postoperative Plan: Postoperative administration of opioids is intended.    Pertinent diagnostic labs and testing reviewed    Informed Consent:    Anesthetic plan and risks discussed with patient.

## 2022-08-08 NOTE — ANESTHESIA TIME REPORT
Anesthesia Start and Stop Event Times     Date Time Event    8/8/2022 0211 Ready for Procedure     0227 Anesthesia Start     0414 Anesthesia Stop        Responsible Staff  08/08/22    Name Role Begin End    David Jesus M.D. Anesth 0227 0414        Overtime Reason:  overtime    Comments:

## 2022-08-08 NOTE — ANESTHESIA PROCEDURE NOTES
Spinal Block    Date/Time: 8/8/2022 2:21 AM  Performed by: David Jesus M.D.  Authorized by: David Jesus M.D.     Start Time:  8/8/2022 2:21 AM  End Time:  8/8/2022 2:31 AM  Reason for Block: primary anesthetic    patient identified, IV checked, site marked, risks and benefits discussed, surgical consent, monitors and equipment checked, pre-op evaluation and timeout performed    Patient Position:  Sitting  Prep: ChloraPrep, patient draped and sterile technique    Monitoring:  Blood pressure, continuous pulse oximetry and heart rate  Approach:  Midline  Location:  L3-4  Injection Technique:  Single-shot  Skin infiltration:  Lidocaine  Strength:  1%  Dose:  3ml  Needle Type:  Pencan  Needle Gauge:  25 G  CSF flowing pre/post injection:  Yes  Sensory Level:  T4

## 2022-08-08 NOTE — PROGRESS NOTES
The patient received 2 doses of Cytotec and then began having contractions and so a third dose of Cytotec was not placed.  She was started on Pitocin.  Her cervix was checked about 1 hour ago and at that time found to be 2 to 3 cm dilated and 80% effaced and -2 station.  However repetitive late fetal heart rate decelerations were noted.  Because of repetitive late fetal heart decelerations consistent with fetal intolerance of labor and because (given the patient's cervical exam) the patient is remote from being able to achieve a vaginal delivery I recommended to the patient we proceed with  section.  I discussed with the patient and explained to the patient in detail and at length what primary  section is and what primary  section involves along with the risks and benefits and alternatives and why I would recommend primary  section, namely fetal intolerance of labor remote from being able to achieve vaginal delivery, and she agreed.  We will proceed with primary  section.  Walter Andersen MD

## 2022-08-08 NOTE — OP REPORT
DATE OF SERVICE:  2022     PREOPERATIVE DIAGNOSES:  1.  Intrauterine pregnancy at 35 weeks and 1 day gestation.  2.   premature rupture of membranes.  3.  Repetitive late fetal heart rate decelerations, remote from vaginal   delivery (fetal intolerance of labor).  4.  Failed induction of labor.     POSTOPERATIVE DIAGNOSES:  1.  Intrauterine pregnancy at 35 weeks and 1 day gestation.  2.   premature rupture of membranes.  3.  Repetitive late fetal heart rate decelerations, remote from vaginal   delivery (fetal intolerance of labor).  4.  Failed induction of labor.  5.  Live  female  with Apgar scores of 8 and 9 at one and five   minutes respectively and a  weight of 2,260 grams.     PROCEDURE:  Primary low transverse  section.     SURGEON:  Walter Andersen MD     ASSISTANT:  Steff Webb MD     ANESTHESIA:  Spinal.     ANESTHESIOLOGIST:  David Jesus MD      FINDINGS:  1.  Live  female  with Apgar scores of 8 and 9 at one and five   minutes respectively and a  weight of 2260 grams.  2.  A true knot in the umbilical cord is noted.  3.  Normal uterus, normal fallopian tubes bilaterally, normal ovaries   bilaterally.     SPECIMENS:  None.     COMPLICATIONS:  None.     ESTIMATED BLOOD LOSS:  Approximately 600 mL.     DESCRIPTION OF PROCEDURE:  After appropriate consents have been obtained, the   patient was taken to the operating room and given spinal anesthesia.  She was   prepped and draped in the dorsal supine position and a Alfaro catheter was   noted to be in place and draining urine.  Anesthesia was tested and noted to   be excellent.  A Pfannenstiel incision was made in the lower abdomen using a   scalpel and this incision was made a few fingerbreadths superior to the pubic   symphysis.  This incision was continued deeply through the subcutaneous   tissues using Bovie electrocautery and hemostasis was maintained with Bovie    electrocautery.  The anterior rectus fascia was identified and incised   transversely with Bovie electrocautery and this incision was extended   bilaterally with Bovie electrocautery.  The superior aspect of the fascial   incision was doubly grasped with Kocher clamps and undermined from the   underlying rectus muscle with both blunt dissection and Bovie electrocautery.    Next, the inferior aspect of the fascial incision was similarly doubly   grasped with Kocher clamps and undermined from the underlying rectus muscle   with both blunt dissection and Bovie electrocautery.  The midline of the   rectus muscle was identified and separation was created and developed in the   midline of the rectus muscle with blunt dissection with Eloise clamps and   separation was continued superiorly and inferiorly with Bovie electrocautery.    Blunt dissection through the preperitoneal adipose tissues allows   identification of the parietal peritoneum, which was doubly grasped with Eloise   clamps, incised with Bovie electrocautery with care taken to avoid the   bladder and this incision was extended superiorly and inferiorly with Bovie   electrocautery with care taken to avoid the bladder.  Retractors were   introduced to expose the anterior lower uterine segment.  The serosa overlying   the segment was grasped and incised with Metzenbaum scissors with care taken   to avoid the bladder and this incision was extended bilaterally with   Metzenbaum scissors with care taken to avoid the bladder.  The bladder was   dissected away anteriorly with blunt dissection.  Retractors were adjusted to   re-expose the anterior lower uterine segment, which was incised transversely   with a scalpel.  The hysterotomy was extended bilaterally with bandage   scissors and blunt dissection and entry through the amnion allows access to   the intrauterine cavity.  A hand was placed in the intrauterine cavity around   baby's head.  Baby's head was elevated  up and out of the pelvis and then   delivered through the hysterotomy and fundal pressure was used to easily   deliver the baby's head and then the baby's body.  Baby's nasopharynx was then   suctioned with a bulb syringe.  The umbilical cord was doubly clamped and cut   and baby was handed to the awaiting nurse.  Of note, a true knot in the umbilical cord is noted.     The placenta was manually removed.  The uterus was exteriorized and the   interior of the uterus was wiped clean of products of conception.  The   hysterotomy was reapproximated with a continuous interlocking suture of 1   chromic and then a second layer (continuous simple suture of 1 chromic) was   placed to imbricate the first.  The entire length of the hysterotomy repair   was examined and hemostasis noted to be excellent.  The uterus and both tubes   and both ovaries were examined and noted to be normal and the uterus was firm.    The uterus was replaced in the peritoneal cavity.  The retractors were   introduced to re-expose the anterior lower uterine segment and once again the   entire length of the hysterotomy repair was examined along its entire length   and again hemostasis noted to be excellent.  Retractors were removed.  Lap and   needle counts reported to be correct at this time.  The parietal peritoneum   was identified and reapproximated with simple continuous suture using 2-0   Vicryl.  The rectus muscles were reapproximated in the midline with placement   of multiple interrupted mattress sutures of 2-0 chromic.  Hemostasis noted to   be excellent.  The anterior rectus fascia was identified and reapproximated   with simple continuous suture using 1 Vicryl.  The wound was copiously   irrigated and drained and hemostasis noted to be excellent.  The subcutaneous   tissues were reapproximated with simple continuous suture using 2-0 Vicryl.    Finally, the skin was reapproximated with placement of many interrupted buried   sutures of 4-0  Monocryl placed in the dermis and at least 1 set sutures   placed for every 1 cm of length along the entire length of the skin incision.    The skin incision was thus reapproximated nicely in this way.  The procedure   was terminated.  Final lap and needle counts reported to be correct x2 at the   end of the procedure.  The patient tolerated the procedure well and sent to   postanesthesia recovery in stable condition.        ______________________________  Walter Andersen MD    MED/SUB    DD:  08/08/2022 04:18  DT:  08/08/2022 06:22    Job#:  [id number]    CC:Steff Webb MD(User)  David Jesus MD(User)  BATSHEVA YE MD

## 2022-08-08 NOTE — CARE PLAN
The patient is Stable - Low risk of patient condition declining or worsening    Shift Goals  Clinical Goals: healthy baby, healthy mom  Patient Goals: healthy baby, healthy mom  Family Goals: stable    Progress made toward(s) clinical / shift goals:        Problem: Knowledge Deficit - L&D  Goal: Patient and family/caregivers will demonstrate understanding of plan of care, disease process/condition, diagnostic tests and medications  Outcome: Progressing     Problem: Risk for Excess Fluid Volume  Goal: Patient will demonstrate pulse, blood pressure and neurologic signs within expected ranges and without any respiratory complications  Outcome: Progressing     Problem: Psychosocial - L&D  Goal: Patient's level of anxiety will decrease  Outcome: Progressing  Goal: Patient will be able to discuss coping skills during hospitalization  Outcome: Progressing  Goal: Patient's ability to re-evaluate and adapt role responsibilities will improve  Outcome: Progressing  Goal: Spiritual and cultural needs incorporated into hospitalization  Outcome: Progressing     Problem: Risk for Venous Thromboembolism (VTE)  Goal: VTE prevention measures will be implemented and patient will remain free from VTE  Outcome: Progressing     Problem: Risk for Infection and Impaired Wound Healing  Goal: Patient will remain free from infection  Outcome: Progressing  Goal: Patient's wound/surgical incision will decrease in size and heals properly  Outcome: Progressing     Problem: Risk for Fluid Imbalance  Goal: Patient's fluid volume balance will be maintained or improve  Outcome: Progressing     Problem: Risk for Injury  Goal: Patient and fetus will be free of preventable injury/complications  Outcome: Progressing     Problem: Pain  Goal: Patient's pain will be alleviated or reduced to the patient’s comfort goal  Outcome: Progressing     Problem: Discharge Barriers/Planning  Goal: Patient's continuum of care needs are met  Outcome: Progressing      Problem: Pain - Standard  Goal: Alleviation of pain or a reduction in pain to the patient’s comfort goal  Outcome: Progressing     Problem: Knowledge Deficit - Standard  Goal: Patient and family/care givers will demonstrate understanding of plan of care, disease process/condition, diagnostic tests and medications  Outcome: Progressing       Patient is not progressing towards the following goals:

## 2022-08-08 NOTE — OR SURGEON
Immediate Post OP Note    PreOp Diagnosis:   1.)  Intrauterine pregnancy at 35 weeks and 1 day gestation.  2.)   premature rupture of membranes.  3.)  Repetitive late fetal heart rate decelerations remote from vaginal delivery (fetal intolerance of labor).  4.)  Failed induction of labor.    PostOp Diagnosis:   1.)  Intrauterine pregnancy at 35 weeks and 1 day gestation.  2.)   premature rupture of membranes.  3.)  Repetitive late fetal heart rate decelerations remote from vaginal delivery (fetal intolerance of labor).  4.)  Failed induction of labor.  5.)  Live  female  with Apgar scores of 8 and 9 at 1 and 5 minutes respectively and a  weight of 2,260 grams.    Procedure(s):   SECTION, PRIMARY - Wound Class: Clean Contaminated    Surgeon(s):  JOSE Danielle M.D.    Anesthesiologist/Type of Anesthesia:  Anesthesiologist: David Jesus M.D./Spinal    Surgical Staff:  Circulator: Hermelinda Giles R.N.  Scrub Person: Cate CROWE&AYAKA Baby  Nurse: Jenise Rice R.N.    Specimens removed if any:  None.    Estimated Blood Loss:   Approximately 600 cc.    Findings:   1.)  Live  female  with Apgar scores of 8 and 9 at 1 and 5 minutes respectively and a  weight of 2,260 grams.  2.)  Normal uterus and normal fallopian tubes bilaterally and normal ovaries bilaterally.    Complications:   None.        2022 4:02 AM Walter Andersen M.D.

## 2022-08-08 NOTE — PROGRESS NOTES
Received report from Parish RIVAS RN - POC discussed, assumed care.     At bedside - patient is comfortable with some pain at this time, but denies pain management. Educated about different options for pain, positioning, movement and to voice any concerns or needs.  FOB voiced concerns about how early the baby is, and we talked about the steroids given, the transition nurse and RT if anything is noted that seems abnormal. Both FOB and patient verbalized relief.     Dr. Andersen updated with GBS negative results - order to D antibiotics  Patient is approved to walk the unit halls    2020 Birthing ball in room with education on hip movement vs. Bouncing.  Let patient know she can walk the hallways with FOB at side and to let nurse know.     SVE /-3    214 Dr. Andersen updated   Orders received - see MAR    0030 IUPC placed by KVNG Evans RN   SVE 2-3/80/-2    0113 Called Dr. Andersen requested at bedside.    0115 Dr. Andersen at bedside - educated patient on need for primary  section.    Patient verbalizes her understanding but is visibly saddened with the outcome.    Patient prepped for surgery    0220 Patient moved in bed to OR    0225 In OR    0307 Delivery of viable female infant  True knot in cord  APGAR's 8/9    0402 Out of OR 2 to PACU via jelly    6228 Report given to Sandy ALCALA

## 2022-08-08 NOTE — LACTATION NOTE
This note was copied from a baby's chart.  Initial visit for late  infant, mother's first baby. Mother reports a sleepy feeding at breast earlier and then her RN taught her paced bottle feeding with formula, she also removed some colostrum drops with her first pumping session and has appropriate breast pump settings written on white board in room.    She is following a 3 step LPI feeding plan including offering breast then supplementing with formula per feeding volume guidelines, then double pumping breasts. Provided handouts on LPI and milk storage and reviewed supplemental feeding volume guidelines. Lactation to follow as needed.

## 2022-08-09 LAB
ERYTHROCYTE [DISTWIDTH] IN BLOOD BY AUTOMATED COUNT: 48.2 FL (ref 35.9–50)
HCT VFR BLD AUTO: 35.1 % (ref 37–47)
HGB BLD-MCNC: 11.8 G/DL (ref 12–16)
MCH RBC QN AUTO: 32.2 PG (ref 27–33)
MCHC RBC AUTO-ENTMCNC: 33.6 G/DL (ref 33.6–35)
MCV RBC AUTO: 95.6 FL (ref 81.4–97.8)
PLATELET # BLD AUTO: 234 K/UL (ref 164–446)
PMV BLD AUTO: 10.4 FL (ref 9–12.9)
RBC # BLD AUTO: 3.67 M/UL (ref 4.2–5.4)
WBC # BLD AUTO: 14.3 K/UL (ref 4.8–10.8)

## 2022-08-09 PROCEDURE — 700102 HCHG RX REV CODE 250 W/ 637 OVERRIDE(OP): Performed by: SPECIALIST

## 2022-08-09 PROCEDURE — 85027 COMPLETE CBC AUTOMATED: CPT

## 2022-08-09 PROCEDURE — 700111 HCHG RX REV CODE 636 W/ 250 OVERRIDE (IP): Performed by: ANESTHESIOLOGY

## 2022-08-09 PROCEDURE — 36415 COLL VENOUS BLD VENIPUNCTURE: CPT

## 2022-08-09 PROCEDURE — 770002 HCHG ROOM/CARE - OB PRIVATE (112)

## 2022-08-09 PROCEDURE — A9270 NON-COVERED ITEM OR SERVICE: HCPCS | Performed by: SPECIALIST

## 2022-08-09 RX ADMIN — ACETAMINOPHEN 1000 MG: 500 TABLET ORAL at 06:37

## 2022-08-09 RX ADMIN — IBUPROFEN 800 MG: 800 TABLET, FILM COATED ORAL at 13:52

## 2022-08-09 RX ADMIN — ACETAMINOPHEN 1000 MG: 500 TABLET ORAL at 19:15

## 2022-08-09 RX ADMIN — ACETAMINOPHEN 1000 MG: 500 TABLET ORAL at 12:40

## 2022-08-09 RX ADMIN — DOCUSATE SODIUM 100 MG: 100 CAPSULE, LIQUID FILLED ORAL at 08:31

## 2022-08-09 RX ADMIN — PRENATAL WITH FERROUS FUM AND FOLIC ACID 1 TABLET: 3080; 920; 120; 400; 22; 1.84; 3; 20; 10; 1; 12; 200; 27; 25; 2 TABLET ORAL at 08:33

## 2022-08-09 RX ADMIN — IBUPROFEN 800 MG: 800 TABLET, FILM COATED ORAL at 22:06

## 2022-08-09 RX ADMIN — KETOROLAC TROMETHAMINE 30 MG: 30 INJECTION, SOLUTION INTRAMUSCULAR at 02:04

## 2022-08-09 RX ADMIN — IBUPROFEN 800 MG: 800 TABLET, FILM COATED ORAL at 08:31

## 2022-08-09 ASSESSMENT — PAIN DESCRIPTION - PAIN TYPE
TYPE: ACUTE PAIN

## 2022-08-09 NOTE — PROGRESS NOTES
Assessment complete. Fundus firm, lochia light. VSS. Tolerating diet. Voiding without difficulty. Incision dressing dry and intact with old drainage. Pain controlled with prn medications per mar. Will offer pain medications as they become available. FOB at bedside, bonding with pt/baby. POC discussed with pt. Encouraged to call with needs. Call light in place.

## 2022-08-09 NOTE — PROGRESS NOTES
2000: Assessment completed, fundus firm, lochia light, incision intact with mepilex silver dressing. Plan of care reviewed. Denies pain at this time, will call if intervention needed.

## 2022-08-09 NOTE — CARE PLAN
The patient is Stable - Low risk of patient condition declining or worsening    Shift Goals  Clinical Goals: healthy baby, healthy mom  Patient Goals: healthy baby, healthy mom  Family Goals: stable    Progress made toward(s) clinical / shift goals:  VS will remain WNL    Patient is not progressing towards the following goals: n/a

## 2022-08-09 NOTE — CARE PLAN
The patient is Stable - Low risk of patient condition declining or worsening    Shift Goals  Clinical Goals: Maintain stable vitals  Patient Goals: healthy baby, healthy mom  Family Goals: stable    Progress made toward(s) clinical / shift goals:    Problem: Knowledge Deficit - Postpartum  Goal: Patient will verbalize and demonstrate understanding of self and infant care  Note: Patient demonstrates effective understanding of self and infant care.      Problem: Altered Physiologic Condition  Goal: Patient physiologically stable as evidenced by normal lochia, palpable uterine involution and vitals within normal limits  Note: Fundus firm, lochia light

## 2022-08-09 NOTE — PROGRESS NOTES
Progress Note    Subjective:   Doing well. Ambulating well. Complaining of min pain at the incision. BF with assistance. No sig bleeding or discharge.    Objective Data:  Recent Labs     08/07/22  1050 08/09/22  0514   WBC 9.9 14.3*   RBC 4.48 3.67*   HEMOGLOBIN 14.4 11.8*   HEMATOCRIT 41.5 35.1*   MCV 92.6 95.6   MCH 32.1 32.2   MCHC 34.7 33.6   RDW 45.4 48.2   PLATELETCT 271 234   MPV 10.3 10.4     Recent Labs     08/07/22  1050   SODIUM 138   POTASSIUM 3.7   CHLORIDE 106   CO2 17*   GLUCOSE 102*   BUN 8   CREATININE 0.41*   CALCIUM 8.9       Vitals:    08/08/22 1812 08/08/22 2200 08/09/22 0200 08/09/22 0600   BP: 133/83 108/72 115/79 116/69   Pulse: 88 88 69 67   Resp: 16 16 16 16   Temp: 36.8 °C (98.2 °F) 36.4 °C (97.5 °F) 36.4 °C (97.5 °F) 36 °C (96.8 °F)   TempSrc: Temporal Temporal Temporal Temporal   SpO2: 96% 97% 96% 97%   Weight:       Height:         Abdomen: soft non tender fundus; incision covered with a Mepilex dressing  Periuneum; no sig bleeding on pad    Intake/Output Summary (Last 24 hours) at 8/9/2022 0738  Last data filed at 8/8/2022 1545  Gross per 24 hour   Intake --   Output 1250 ml   Net -1250 ml       Current Facility-Administered Medications   Medication Dose Route Frequency Provider Last Rate Last Admin   • lactated ringers infusion   Intravenous PRN Walter Andersen M.D.       • ibuprofen (MOTRIN) tablet 800 mg  800 mg Oral Q8HRS Walter Andersen M.D.        Followed by   • [START ON 8/12/2022] ibuprofen (MOTRIN) tablet 800 mg  800 mg Oral Q8HRS PRN Walter Andersen M.D.       • acetaminophen (TYLENOL) tablet 1,000 mg  1,000 mg Oral Q6HRS Walter Andersen M.D.   1,000 mg at 08/09/22 0637    Followed by   • [START ON 8/12/2022] acetaminophen (TYLENOL) tablet 1,000 mg  1,000 mg Oral Q6HRS PRN Walter Andersen M.D.       • oxyCODONE immediate-release (ROXICODONE) tablet 5 mg  5 mg Oral Q4HRS PRN Walter Andersen M.D.       • oxyCODONE immediate release (ROXICODONE) tablet 10 mg  10 mg Oral  Q4HRS PRN Walter Andersen M.D.       • ondansetron (ZOFRAN) syringe/vial injection 4 mg  4 mg Intravenous Q6HRS PRN Walter Andersen M.D.        Or   • ondansetron (ZOFRAN ODT) dispertab 4 mg  4 mg Oral Q6HRS PRN Walter Andersen M.D.       • diphenhydrAMINE (BENADRYL) tablet/capsule 25 mg  25 mg Oral Q6HRS PRN Walter Andersen M.D.        Or   • diphenhydrAMINE (BENADRYL) injection 25 mg  25 mg Intravenous Q6HRS PRN Walter Andersen M.D.       • docusate sodium (COLACE) capsule 100 mg  100 mg Oral BID PRN Walter Andersen M.D.       • tetanus-dipth-acell pertussis (Tdap) inj 0.5 mL  0.5 mL Intramuscular Once PRN Walter Andersen M.D.       • measles, mumps and rubella vaccine (MMR) injection 0.5 mL  0.5 mL Subcutaneous Once PRN Walter Andersen M.D.       • oxytocin (PITOCIN) infusion (for postpartum)  125 mL/hr Intravenous Continuous Walter Andersen M.D.       • oxytocin (PITOCIN) injection 10 Units  10 Units Intramuscular Once PRN Walter Andersen M.D.       • miSOPROStol (CYTOTEC) tablet 800 mcg  800 mcg Rectal Once PRN Walter Andersen M.D.       • methylergonovine (METHERGINE) injection 0.2 mg  0.2 mg Intramuscular Once PRN Walter Andersen M.D.       • carboPROST (HEMABATE) injection 250 mcg  250 mcg Intramuscular Once PRN Walter Andersen M.D.       • prenatal plus vitamin (STUARTNATAL 1+1) 27-1 MG tablet 1 Tablet  1 Tablet Oral Daily-0800 Walter Andersen M.D.   1 Tablet at 08/08/22 1144   • oxytocin (PITOCIN) infusion (for post delivery)  125 mL/hr Intravenous Continuous Walter Andersen M.D. 125 mL/hr at 08/08/22 0519 125 mL/hr at 08/08/22 0519       A/P S/P Primary LTCS now PPD/POD #1. Plan to proceed with the usual pp management.

## 2022-08-09 NOTE — LACTATION NOTE
This note was copied from a baby's chart.  Follow up:    MOB continuing 3 step plan:    1) breastfeed depending on cues, limit feed attempts to less than 30min   2) Supplement with formula according to supplemental feeding guideline every 3hrs.  Current volumes between 10-20ml    3) HG [employer] double pump.  25mm inserts. 80/60cpm at 2min at 30% for total of 15min.  Every 3hrs. Follow with HE for 2min    Infant has been taking 15ml.  Reinforced paced bottle feeding technique however baby has been sleepy with feeds.  Takes approx 10-15min to finish a bottle.     MOB does not have a personal pump.  Will contact insurance this week.  Discussed option to rent HG pump. MOB will think about option.     Will f/u tomorrow.

## 2022-08-10 VITALS
RESPIRATION RATE: 17 BRPM | SYSTOLIC BLOOD PRESSURE: 112 MMHG | DIASTOLIC BLOOD PRESSURE: 77 MMHG | OXYGEN SATURATION: 95 % | HEART RATE: 79 BPM | TEMPERATURE: 97.9 F | WEIGHT: 170 LBS | BODY MASS INDEX: 30.12 KG/M2 | HEIGHT: 63 IN

## 2022-08-10 PROCEDURE — 700102 HCHG RX REV CODE 250 W/ 637 OVERRIDE(OP): Performed by: SPECIALIST

## 2022-08-10 PROCEDURE — A9270 NON-COVERED ITEM OR SERVICE: HCPCS | Performed by: SPECIALIST

## 2022-08-10 RX ADMIN — ACETAMINOPHEN 1000 MG: 500 TABLET ORAL at 13:01

## 2022-08-10 RX ADMIN — DOCUSATE SODIUM 100 MG: 100 CAPSULE, LIQUID FILLED ORAL at 10:15

## 2022-08-10 RX ADMIN — IBUPROFEN 800 MG: 800 TABLET, FILM COATED ORAL at 06:18

## 2022-08-10 RX ADMIN — PRENATAL WITH FERROUS FUM AND FOLIC ACID 1 TABLET: 3080; 920; 120; 400; 22; 1.84; 3; 20; 10; 1; 12; 200; 27; 25; 2 TABLET ORAL at 10:15

## 2022-08-10 RX ADMIN — ACETAMINOPHEN 1000 MG: 500 TABLET ORAL at 00:23

## 2022-08-10 RX ADMIN — IBUPROFEN 800 MG: 800 TABLET, FILM COATED ORAL at 14:01

## 2022-08-10 RX ADMIN — ACETAMINOPHEN 1000 MG: 500 TABLET ORAL at 06:18

## 2022-08-10 NOTE — CARE PLAN
The patient is Stable - Low risk of patient condition declining or worsening    Shift Goals  Clinical Goals: rest, pain control, breastfeeding support  Patient Goals: healthy baby, healthy mom  Family Goals: stable    Progress made toward(s) clinical / shift goals:  clinically stable  Problem: Pain - Standard  Goal: Alleviation of pain or a reduction in pain to the patient’s comfort goal  Outcome: Progressing  Note: Pain controlled. Will be medicated as needed/scheduled     Problem: Altered Physiologic Condition  Goal: Patient physiologically stable as evidenced by normal lochia, palpable uterine involution and vitals within normal limits  Outcome: Progressing  Note: Fundus firm at U, lochia rubra minimal, surgical incision with mepilex dressing CDI. VSS       Patient is not progressing towards the following goals:

## 2022-08-10 NOTE — PROGRESS NOTES
Assessment complete. Fundus firm, lochia light. VSS. Tolerating diet. Voiding without difficulty. Incision dressing replaced due to compromised integrity of dressing border. Pt complains of a cramping sensation in the left lower extremity. No edema, erythema, or pain noted upon assessment of the left lower extremity and negative Christoph's sign. Will continue to monitor. Pt understands to call if pain worsens. Pain controlled with scheduled medications per mar. Will offer pain medications as they become available. FOB at bedside, bonding with pt/baby. POC discussed with pt. Pt understands the importance of ambulation. Encouraged to call with needs. Call light in place.

## 2022-08-10 NOTE — CARE PLAN
The patient is Stable - Low risk of patient condition declining or worsening    Shift Goals  Clinical Goals: rest, pain control, breastfeeding support  Patient Goals: healthy baby, healthy mom  Family Goals: stable    Progress made toward(s) clinical / shift goals:  patient up and voiding, passing gas, bonding well with infant. Discussed discharge.    Patient is not progressing towards the following goals:

## 2022-08-10 NOTE — DISCHARGE SUMMARY
DATE OF ADMISSION:  2022   DATE OF DISCHARGE:  08/10/2022     DISCHARGE DIAGNOSES:  1.  Status post a primary low transverse  section at 35 and 1/7 weeks'   gestation for  premature rupture of membranes with fetal intolerance   of labor and a failed induction.  2.  Uncomplicated postoperative and postpartum course.     HISTORY OF PRESENT ILLNESS:  This is a 22-year-old  1, para 0 at 35   weeks' gestation, presenting to labor and delivery with complaints of leakage   of fluid per the vaginal vault.  The patient did have an uncomplicated   pregnancy.  She was seen and evaluated by the on-call provider, Dr. Walter Andersen, after gross spontaneous rupture of membranes were confirmed, diagnosis   of premature  rupture of membranes with unknown group B strep status.    The patient was started on clindamycin given her penicillin allergy.  She had   overall reassuring fetal status.  Plan was to proceed forward with an   induction of labor at this time.     Past medical history and physical exam can be found on dictated history and   physical.     ASSESSMENT AND PLAN:  A 22-year-old  1, para 0 at 35 weeks' gestation   with  premature rupture of membranes, seen and evaluated by Dr. Walter Andersen, who made a decision to proceed forward with a dose of Cytotec and   start antibiotics per unknown group B strep status protocol with clindamycin.     HOSPITAL COURSE:  As stated above, the patient was admitted, did receive 2   doses of Cytotec, followed by Pitocin per protocol. At 2-3 cm dilation, 80%   effaced, -2 station, she began having repetitive late decelerations. Given her   remoteness from delivery and fetal intolerance of labor, the plan was to   proceed forward with a primary low transverse  section.     She thus did undergo primary low transverse  section with Dr. Walter Andersen and Dr. Steff Kee for fetal intolerance of labor.  Apgars were 8   and 9  at 1 and 5 minutes respectively with a  weight of 2260 grams.    Approximate blood loss at the time of delivery 600 mL. Her postpartum course   was unremarkable.  Postpartum day #2, she was ambulating and voiding well,   tolerating a regular diet.  She was breastfeeding well.  She had very minimal   lochia rubra.  Her incision was covered, had appropriate tenderness to   palpation.  She was afebrile throughout her entire postoperative course and   was felt to be appropriate for discharge with a discharge plan to follow up in   2 weeks and 6 weeks.     DISCHARGE INSTRUCTIONS:  She is to call with any increased temperature greater   than 100.4, increasing vaginal bleeding, abdominal pain unrelieved with any   p.o. pain medication and call with any other questions or concerns.     DISCHARGE MEDICATIONS:  Motrin and Norco, which will be sent to her pharmacy.        ______________________________  MD JACOBO Edmond/SHERRI    DD:  08/10/2022 08:23  DT:  08/10/2022 08:38    Job#:  [id number]

## 2022-08-10 NOTE — PROGRESS NOTES
At approximately this time reviewed discharge paperwork and follow up appointments with patient and significant other who were at bedside. Reviewed prescriptions and infant care. Verbalized understanding. Removed cuddles tag, advised patient to call upon placement of infant in car seat for primary RN to verify bands and check car seat safety.     Patient escorted to car with CNA and and care aide.

## 2022-08-10 NOTE — LACTATION NOTE
This note was copied from a baby's chart.  Follow up lactation for discharge plan. Mom is currently attempting to nurse baby, then offering a bottle of EXBM or ABM according to supplemental volumes guidelines, then double pump ng. LC discussed pump options for home. FOB stated that he called [facility] and they said it would take few weeks to get pump. LC recommended that parents rent a HG for two weeks while waiting for their personal pump. Information given to FOB for rentals. LC demonstrated paced bottle feeding and arousal techniques, as well as burping  techniques. LC discussed the importance of stimulating her breast 8 x/24 hrs and ensuring baby take all his feeding and accurate volumes.  Parents understand three step plan and rationale. Mom denies any questions at this time.  Parents will follow up with pediatrician in the morning

## 2022-08-10 NOTE — PROGRESS NOTES
Progress Note    Subjective:   Doing well. No issues or concerns. No sig bleeding or discharge. She wishes to go home today. She is breastfeeding.    Objective Data:  Recent Labs     08/07/22  1050 08/09/22  0514   WBC 9.9 14.3*   RBC 4.48 3.67*   HEMOGLOBIN 14.4 11.8*   HEMATOCRIT 41.5 35.1*   MCV 92.6 95.6   MCH 32.1 32.2   MCHC 34.7 33.6   RDW 45.4 48.2   PLATELETCT 271 234   MPV 10.3 10.4     Recent Labs     08/07/22  1050   SODIUM 138   POTASSIUM 3.7   CHLORIDE 106   CO2 17*   GLUCOSE 102*   BUN 8   CREATININE 0.41*   CALCIUM 8.9       Vitals:    08/09/22 0200 08/09/22 0600 08/09/22 1800 08/10/22 0608   BP: 115/79 116/69 121/79 112/77   Pulse: 69 67 92 79   Resp: 16 16 18 17   Temp: 36.4 °C (97.5 °F) 36 °C (96.8 °F) 37.2 °C (98.9 °F) 36.6 °C (97.9 °F)   TempSrc: Temporal Temporal Temporal Temporal   SpO2: 96% 97%  95%   Weight:       Height:         Abdomen: soft non tender fundus with only min ttp at covered incision  Perineum: no sig bleeding or discharge  Ext: non tender calves    No intake or output data in the 24 hours ending 08/10/22 0814    Current Facility-Administered Medications   Medication Dose Route Frequency Provider Last Rate Last Admin    lactated ringers infusion   Intravenous PRN Walter Andersen M.D.        ibuprofen (MOTRIN) tablet 800 mg  800 mg Oral Q8HRS Walter Andersen M.D.   800 mg at 08/10/22 0618    Followed by    [START ON 8/12/2022] ibuprofen (MOTRIN) tablet 800 mg  800 mg Oral Q8HRS PRN Walter Andersen M.D.        acetaminophen (TYLENOL) tablet 1,000 mg  1,000 mg Oral Q6HRS Walter Andersen M.D.   1,000 mg at 08/10/22 0618    Followed by    [START ON 8/12/2022] acetaminophen (TYLENOL) tablet 1,000 mg  1,000 mg Oral Q6HRS PRDELICIA Andersen M.D.        oxyCODONE immediate-release (ROXICODONE) tablet 5 mg  5 mg Oral Q4HRS PRN Walter Andersen M.D.        oxyCODONE immediate release (ROXICODONE) tablet 10 mg  10 mg Oral Q4HRS PRN Walter Andersen M.D.        ondansetron (ZOFRAN)  syringe/vial injection 4 mg  4 mg Intravenous Q6HRS PRN Walter Andersen M.D.        Or    ondansetron (ZOFRAN ODT) dispertab 4 mg  4 mg Oral Q6HRS PRN Walter Andersen M.D.        diphenhydrAMINE (BENADRYL) tablet/capsule 25 mg  25 mg Oral Q6HRS PRN Walter Andersen M.D.        Or    diphenhydrAMINE (BENADRYL) injection 25 mg  25 mg Intravenous Q6HRS PRN Walter Andersen M.D.        docusate sodium (COLACE) capsule 100 mg  100 mg Oral BID PRN Walter Andersen M.D.   100 mg at 08/09/22 0831    tetanus-dipth-acell pertussis (Tdap) inj 0.5 mL  0.5 mL Intramuscular Once PRN Walter Andersen M.D.        measles, mumps and rubella vaccine (MMR) injection 0.5 mL  0.5 mL Subcutaneous Once PRN Walter Andersen M.D.        oxytocin (PITOCIN) infusion (for postpartum)  125 mL/hr Intravenous Continuous Walter Andersen M.D.        oxytocin (PITOCIN) injection 10 Units  10 Units Intramuscular Once PRN Walter Andersen M.D.        miSOPROStol (CYTOTEC) tablet 800 mcg  800 mcg Rectal Once PRN Walter Andersen M.D.        methylergonovine (METHERGINE) injection 0.2 mg  0.2 mg Intramuscular Once PRN Walter Andersen M.D.        carboPROST (HEMABATE) injection 250 mcg  250 mcg Intramuscular Once PRN Walter Andersen M.D.        prenatal plus vitamin (STUARTNATAL 1+1) 27-1 MG tablet 1 Tablet  1 Tablet Oral Daily-0800 Walter Andersen M.D.   1 Tablet at 08/09/22 0833    oxytocin (PITOCIN) infusion (for post delivery)  125 mL/hr Intravenous Continuous Walter Andersen M.D. 125 mL/hr at 08/08/22 0519 125 mL/hr at 08/08/22 0519       A/P 21 yo S/P Primary LTCS now POD #2. Plan to proceed with the discharge home with f/u in 2 weeks. Discharge instructions reviewed. All questions were answered.

## 2022-08-10 NOTE — CARE PLAN
The patient is Stable - Low risk of patient condition declining or worsening    Shift Goals  Clinical Goals: Ambulation, skin to skin, pain control, independence with baby cares  Patient Goals: healthy baby, healthy mom  Family Goals: stable    Progress made toward(s) clinical / shift goals:  Pt ambulated in hallways two times this shift. Pt performs skin to skin throughout shift without prompting. Pt pain is managed with scheduled medications. Pt demonstrates effective bonding. Pt changes diapers, breast feeds every 3 hours,     Patient is not progressing towards the following goals:N/A

## 2022-08-10 NOTE — PROGRESS NOTES
Pt assessed, fundus firm, lochia light. No s/s of distress. Bonding well w/ infant. Fob @ bs, voiding w/o difficulty. Denies pain at this time, abd inc w/ mepilex, miguel a, cdi, pt passing gas, discharging today.

## 2022-08-10 NOTE — DISCHARGE INSTRUCTIONS

## 2023-11-15 ENCOUNTER — OFFICE VISIT (OUTPATIENT)
Dept: URGENT CARE | Facility: CLINIC | Age: 23
End: 2023-11-15
Payer: COMMERCIAL

## 2023-11-15 VITALS
WEIGHT: 175 LBS | HEART RATE: 94 BPM | SYSTOLIC BLOOD PRESSURE: 130 MMHG | DIASTOLIC BLOOD PRESSURE: 82 MMHG | TEMPERATURE: 97.5 F | OXYGEN SATURATION: 96 % | HEIGHT: 63 IN | BODY MASS INDEX: 31.01 KG/M2

## 2023-11-15 DIAGNOSIS — R07.9 CHEST PAIN, UNSPECIFIED TYPE: ICD-10-CM

## 2023-11-15 PROCEDURE — 3079F DIAST BP 80-89 MM HG: CPT | Performed by: FAMILY MEDICINE

## 2023-11-15 PROCEDURE — 3075F SYST BP GE 130 - 139MM HG: CPT | Performed by: FAMILY MEDICINE

## 2023-11-15 PROCEDURE — 99203 OFFICE O/P NEW LOW 30 MIN: CPT | Performed by: FAMILY MEDICINE

## 2023-11-15 PROCEDURE — 93000 ELECTROCARDIOGRAM COMPLETE: CPT | Performed by: FAMILY MEDICINE

## 2023-11-15 ASSESSMENT — FIBROSIS 4 INDEX: FIB4 SCORE: 0.34

## 2023-11-15 NOTE — PROGRESS NOTES
"  Subjective:      23 y.o. female presents to urgent care for mid sternal chest pain that started 2 weeks ago. There was no inciting event or trauma at that time. The pain is intermittent, unsure of aggravating factors, it comes every other day, it's described as sharp, currently rated 6/10. She has not yet tried any medication with it. There is associated lightheadedness and shaking of her hands bilaterally. She eats 3 meals per day and is staying well hydrated.    Chest pain red flags  -History of hypertension, diabetes, peripheral artery disease, hypercholesterolemia: no  -Recent trauma, major surgery, or medical procedures: no  -Recent long periods of immobility: no  -Tobacco product use: no  -Recreational drug use such as cocaine: no  -Similar pain in the past: no  -Prior cardiac diagnostic tests (ECHO, stress test, coronary CTA): no  -Family history of cardiovascular disease: yes. Maternal grandmother    She denies any other questions or concerns at this time.    Current problem list, medication, and past medical/surgical history were reviewed in Epic.    ROS  See HPI     Objective:      /82 (BP Location: Left arm, Patient Position: Sitting, BP Cuff Size: Adult)   Pulse 94   Temp 36.4 °C (97.5 °F)   Ht 1.6 m (5' 3\")   Wt 79.4 kg (175 lb)   SpO2 96%   BMI 31.00 kg/m²     Physical Exam  Constitutional:       General: She is not in acute distress.     Appearance: She is not diaphoretic.   Cardiovascular:      Rate and Rhythm: Normal rate and regular rhythm.      Heart sounds: Normal heart sounds.      Comments: No discolorations or deformities noted to inspection of chest wall.  Pain is not reproducible to palpation of chest wall.  Pulmonary:      Effort: Pulmonary effort is normal. No respiratory distress.      Breath sounds: Normal breath sounds.   Neurological:      Mental Status: She is alert.   Psychiatric:         Mood and Affect: Affect normal.         Judgment: Judgment normal. "       Assessment/Plan:     1. Chest pain, unspecified type  EKG showing a heartbeat of 87 bpm, regular rhythm, no ST changes.  She did state it feels like she is having heartburn when the pain is there.  We discussed that may very well be what is happening.  No red flag warning signs.  Low suspicion for cardiology etiology.  - EKG - Clinic Performed      Instructed to return to Urgent Care or nearest Emergency Department if symptoms fail to improve, for any change in condition, further concerns, or new concerning symptoms. Patient states understanding of the plan of care and discharge instructions.    Hue Keene M.D.

## 2023-11-15 NOTE — LETTER
November 15, 2023    To Whom It May Concern:         This is confirmation that Mari Mittal attended her scheduled appointment with Hue Keene M.D. on 11/15/23.  She may return to work tomorrow without any restrictions.         If you have any questions please do not hesitate to call me at the phone number listed below.    Sincerely,          Hue Keene M.D.  [phone]

## 2023-11-16 ENCOUNTER — HOSPITAL ENCOUNTER (OUTPATIENT)
Dept: LAB | Facility: MEDICAL CENTER | Age: 23
End: 2023-11-16
Attending: PHYSICIAN ASSISTANT
Payer: COMMERCIAL

## 2023-11-16 LAB
ALBUMIN SERPL BCP-MCNC: 4.2 G/DL (ref 3.2–4.9)
ALBUMIN/GLOB SERPL: 1.6 G/DL
ALP SERPL-CCNC: 100 U/L (ref 30–99)
ALT SERPL-CCNC: 28 U/L (ref 2–50)
ANION GAP SERPL CALC-SCNC: 8 MMOL/L (ref 7–16)
AST SERPL-CCNC: 17 U/L (ref 12–45)
BASOPHILS # BLD AUTO: 0.7 % (ref 0–1.8)
BASOPHILS # BLD: 0.05 K/UL (ref 0–0.12)
BILIRUB SERPL-MCNC: 0.8 MG/DL (ref 0.1–1.5)
BUN SERPL-MCNC: 11 MG/DL (ref 8–22)
CALCIUM ALBUM COR SERPL-MCNC: 8.8 MG/DL (ref 8.5–10.5)
CALCIUM SERPL-MCNC: 9 MG/DL (ref 8.5–10.5)
CHLORIDE SERPL-SCNC: 105 MMOL/L (ref 96–112)
CHOLEST SERPL-MCNC: 160 MG/DL (ref 100–199)
CO2 SERPL-SCNC: 26 MMOL/L (ref 20–33)
CREAT SERPL-MCNC: 0.55 MG/DL (ref 0.5–1.4)
EOSINOPHIL # BLD AUTO: 0.31 K/UL (ref 0–0.51)
EOSINOPHIL NFR BLD: 4.4 % (ref 0–6.9)
ERYTHROCYTE [DISTWIDTH] IN BLOOD BY AUTOMATED COUNT: 47.7 FL (ref 35.9–50)
EST. AVERAGE GLUCOSE BLD GHB EST-MCNC: 117 MG/DL
FASTING STATUS PATIENT QL REPORTED: NORMAL
GFR SERPLBLD CREATININE-BSD FMLA CKD-EPI: 131 ML/MIN/1.73 M 2
GLOBULIN SER CALC-MCNC: 2.7 G/DL (ref 1.9–3.5)
GLUCOSE SERPL-MCNC: 84 MG/DL (ref 65–99)
HBA1C MFR BLD: 5.7 % (ref 4–5.6)
HCT VFR BLD AUTO: 46 % (ref 37–47)
HDLC SERPL-MCNC: 36 MG/DL
HGB BLD-MCNC: 14.7 G/DL (ref 12–16)
IMM GRANULOCYTES # BLD AUTO: 0.03 K/UL (ref 0–0.11)
IMM GRANULOCYTES NFR BLD AUTO: 0.4 % (ref 0–0.9)
LDLC SERPL CALC-MCNC: 105 MG/DL
LYMPHOCYTES # BLD AUTO: 1.2 K/UL (ref 1–4.8)
LYMPHOCYTES NFR BLD: 17 % (ref 22–41)
MCH RBC QN AUTO: 31.6 PG (ref 27–33)
MCHC RBC AUTO-ENTMCNC: 32 G/DL (ref 32.2–35.5)
MCV RBC AUTO: 98.9 FL (ref 81.4–97.8)
MONOCYTES # BLD AUTO: 1.1 K/UL (ref 0–0.85)
MONOCYTES NFR BLD AUTO: 15.6 % (ref 0–13.4)
NEUTROPHILS # BLD AUTO: 4.36 K/UL (ref 1.82–7.42)
NEUTROPHILS NFR BLD: 61.9 % (ref 44–72)
NRBC # BLD AUTO: 0 K/UL
NRBC BLD-RTO: 0 /100 WBC (ref 0–0.2)
PLATELET # BLD AUTO: 276 K/UL (ref 164–446)
PMV BLD AUTO: 10.1 FL (ref 9–12.9)
POTASSIUM SERPL-SCNC: 3.8 MMOL/L (ref 3.6–5.5)
PROT SERPL-MCNC: 6.9 G/DL (ref 6–8.2)
RBC # BLD AUTO: 4.65 M/UL (ref 4.2–5.4)
SODIUM SERPL-SCNC: 139 MMOL/L (ref 135–145)
TRIGL SERPL-MCNC: 93 MG/DL (ref 0–149)
TSH SERPL DL<=0.005 MIU/L-ACNC: 1.91 UIU/ML (ref 0.38–5.33)
WBC # BLD AUTO: 7.1 K/UL (ref 4.8–10.8)

## 2023-11-16 PROCEDURE — 36415 COLL VENOUS BLD VENIPUNCTURE: CPT

## 2023-11-16 PROCEDURE — 84443 ASSAY THYROID STIM HORMONE: CPT

## 2023-11-16 PROCEDURE — 83036 HEMOGLOBIN GLYCOSYLATED A1C: CPT

## 2023-11-16 PROCEDURE — 80061 LIPID PANEL: CPT

## 2023-11-16 PROCEDURE — 85025 COMPLETE CBC W/AUTO DIFF WBC: CPT

## 2023-11-16 PROCEDURE — 80053 COMPREHEN METABOLIC PANEL: CPT

## 2023-12-22 ENCOUNTER — TELEPHONE (OUTPATIENT)
Dept: HEALTH INFORMATION MANAGEMENT | Facility: OTHER | Age: 23
End: 2023-12-22
Payer: COMMERCIAL

## (undated) DEVICE — SUTURE 1 VICRYL PLUS CTX - 36 INCH (36/BX)

## (undated) DEVICE — TAPE CLOTH MEDIPORE 6 INCH - (12RL/CA)

## (undated) DEVICE — SUTURE 2-0 CHROMIC GUT CT-1 27 (36PK/BX)"

## (undated) DEVICE — SET EXTENSION WITH 2 PORTS (48EA/CA) ***PART #2C8610 IS A SUBSTITUTE*****

## (undated) DEVICE — SLEEVE, SEQUENTIAL CALF REG

## (undated) DEVICE — TRAY SPINAL ANESTHESIA NON-SAFETY (10/CA)

## (undated) DEVICE — KIT  I.V. START (100EA/CA)

## (undated) DEVICE — CATHETER IV NON-SAFETY 18 GA X 1 1/4 (50/BX 4BX/CA)

## (undated) DEVICE — WATER IRRIGATION STERILE 1000ML (12EA/CA)

## (undated) DEVICE — GLOVE BIOGEL SZ 7.5 SURGICAL PF LTX - (50PR/BX 4BX/CA)

## (undated) DEVICE — PACK ROOM TURNOVER L&D (12/CA)

## (undated) DEVICE — SUTURE 3-0 MONOCRYL PLUS PS-1 - 27 INCH (36/BX)

## (undated) DEVICE — SUTURE 1 CHROMIC CTX ETHICON - (36PK/BX)

## (undated) DEVICE — PAD GROUNDING PRE-JELLED - (50EA/PK)

## (undated) DEVICE — CANISTER SUCTION 3000ML MECHANICAL FILTER AUTO SHUTOFF MEDI-VAC NONSTERILE LF DISP  (40EA/CA)

## (undated) DEVICE — SODIUM CHL IRRIGATION 0.9% 1000ML (12EA/CA)

## (undated) DEVICE — PACK C-SECTION (2EA/CA)

## (undated) DEVICE — SUTURE 3-0 VICRYL PLUS CT-1 - 36 INCH (36/BX)

## (undated) DEVICE — HEAD HOLDER JUNIOR/ADULT

## (undated) DEVICE — BLANKET UNDERBODY FULL ACCES - (5/CA)

## (undated) DEVICE — TUBING CLEARLINK DUO-VENT - C-FLO (48EA/CA)

## (undated) DEVICE — CHLORAPREP 26 ML APPLICATOR - ORANGE TINT(25/CA)

## (undated) DEVICE — GLOVE BIOGEL SZ 7 SURGICAL PF LTX - (50PR/BX 4BX/CA)